# Patient Record
Sex: FEMALE | Race: OTHER | URBAN - METROPOLITAN AREA
[De-identification: names, ages, dates, MRNs, and addresses within clinical notes are randomized per-mention and may not be internally consistent; named-entity substitution may affect disease eponyms.]

---

## 2019-10-16 ENCOUNTER — APPOINTMENT (RX ONLY)
Dept: URBAN - METROPOLITAN AREA CLINIC 41 | Facility: CLINIC | Age: 59
Setting detail: DERMATOLOGY
End: 2019-10-16

## 2019-10-16 DIAGNOSIS — L259 CONTACT DERMATITIS AND OTHER ECZEMA, UNSPECIFIED CAUSE: ICD-10-CM

## 2019-10-16 DIAGNOSIS — L71.8 OTHER ROSACEA: ICD-10-CM

## 2019-10-16 PROBLEM — L23.9 ALLERGIC CONTACT DERMATITIS, UNSPECIFIED CAUSE: Status: ACTIVE | Noted: 2019-10-16

## 2019-10-16 PROCEDURE — ? PRESCRIPTION

## 2019-10-16 PROCEDURE — ? COUNSELING

## 2019-10-16 PROCEDURE — ? PRODUCT LINE (PHARMACEUTICALS)

## 2019-10-16 PROCEDURE — 99202 OFFICE O/P NEW SF 15 MIN: CPT

## 2019-10-16 PROCEDURE — ? OTHER

## 2019-10-16 RX ORDER — PREDNISONE 20 MG/1
1 TABLET ORAL QD
Qty: 42 | Refills: 0 | Status: ERX

## 2019-10-16 ASSESSMENT — LOCATION ZONE DERM
LOCATION ZONE: FACE
LOCATION ZONE: TRUNK

## 2019-10-16 ASSESSMENT — LOCATION SIMPLE DESCRIPTION DERM
LOCATION SIMPLE: RIGHT UPPER BACK
LOCATION SIMPLE: ABDOMEN
LOCATION SIMPLE: RIGHT CHEEK
LOCATION SIMPLE: LEFT CHEEK

## 2019-10-16 ASSESSMENT — LOCATION DETAILED DESCRIPTION DERM
LOCATION DETAILED: RIGHT INFERIOR CENTRAL MALAR CHEEK
LOCATION DETAILED: LEFT CENTRAL BUCCAL CHEEK
LOCATION DETAILED: LEFT CENTRAL MALAR CHEEK
LOCATION DETAILED: PERIUMBILICAL SKIN
LOCATION DETAILED: RIGHT INFERIOR MEDIAL UPPER BACK

## 2019-10-16 NOTE — PROCEDURE: OTHER
Note Text (......Xxx Chief Complaint.): This diagnosis correlates with the
Detail Level: Zone
Other (Free Text): Recommended patch testing to narrow down what is causing the eruption.

## 2019-10-16 NOTE — PROCEDURE: PRODUCT LINE (PHARMACEUTICALS)
Product 36 Price (In Dollars - Numeric Only, No Special Characters Or $): 0.00
Product 7 Application Directions: Apply to affected areas 1-2 times daily as needed for flares
Product 5 Units: 0
Product 15 Price (In Dollars - Numeric Only, No Special Characters Or $): 50.00
Name Of Product 3: Clobetasol Shampoo
Product 5 Application Directions: Apply to darken pigmented areas once daily for 3 months
Product 1 Price (In Dollars - Numeric Only, No Special Characters Or $): 30.00
Product 17 Price (In Dollars - Numeric Only, No Special Characters Or $): 160.00
Allow Plan To Count Towards E/M Coding: Yes
Name Of Product 11: Clobetasol cream
Product 12 Application Directions: Apply to affected areas once daily Monday - Friday for 6 weeks
Product 3 Price (In Dollars - Numeric Only, No Special Characters Or $): 20.00
Product 4 Application Directions: Apply to affected areas QD-BID as needed for flares
Name Of Product 14: Dermatitis Foam
Name Of Product 8: Tretinoin 0.025% Cream
Product 1 Application Directions: Apply to affected area QD-BID
Name Of Product 6: Rosacea Triple Gel
Product 14 Price (In Dollars - Numeric Only, No Special Characters Or $): 45.00
Name Of Product 2: Clobetasol Foam
Product 9 Application Directions: Apply to face nightly
Name Of Product 13: Tacrolimus ointment
Name Of Product 16: Tazarotene 0.05% hydrating cream
Name Of Product 5: Melasma Emulsion
Product 6 Units: 1
Name Of Product 10: Tretinoin 0.1% Cream
Product 6 Application Directions: Apply to face once daily
Product 3 Application Directions: Wash affected area of scalp once daily
Product 17 Application Directions: Take one pill once daily
Product 11 Application Directions: Apply once or twice daily to affected areas PRN flares
Name Of Product 7: Seborrhiec Dermatitis Cream
Detail Level: Detailed
Product 2 Application Directions: Apply to affected area QD-BID as needed for flares
Name Of Product 12: Actinic Keratosis Gel
Name Of Product 4: Clobetasol Solution
Product 16 Application Directions: Apply once daily to the face after moisturizing
Product 13 Application Directions: Apply once daily to affected areas
Name Of Product 15: Fluocinonide cream
Product 7 Price (In Dollars - Numeric Only, No Special Characters Or $): 40.00
Name Of Product 9: Tretinoin 0.05% Cream
Name Of Product 17: Viviscal
Name Of Product 1: Acne Triple Gel

## 2019-10-16 NOTE — HPI: RASH
How Severe Is Your Rash?: moderate
Is This A New Presentation, Or A Follow-Up?: Rash
Additional History: First of June she received a shingles vaccine . [place] was in the woods as well then the rash occurred. She has been on prednisone tapers twice. \\nAllegra and Benadryl has been used too. \\nShe used clindamycin solution too.

## 2019-10-24 ENCOUNTER — RX ONLY (OUTPATIENT)
Age: 59
Setting detail: RX ONLY
End: 2019-10-24

## 2019-10-24 RX ORDER — PREDNISONE 10 MG/1
1 TABLET ORAL QD
Qty: 49 | Refills: 0 | Status: ERX

## 2019-11-12 ENCOUNTER — RX ONLY (OUTPATIENT)
Age: 59
Setting detail: RX ONLY
End: 2019-11-12

## 2019-11-12 RX ORDER — PREDNISONE 10 MG/1
1 TABLET ORAL QD
Qty: 49 | Refills: 0 | Status: ERX

## 2019-11-27 ENCOUNTER — RX ONLY (OUTPATIENT)
Age: 59
Setting detail: RX ONLY
End: 2019-11-27

## 2019-12-11 ENCOUNTER — RX ONLY (OUTPATIENT)
Age: 59
Setting detail: RX ONLY
End: 2019-12-11

## 2019-12-11 RX ORDER — FLUTICASONE PROPIONATE 0.5 MG/G
1 CREAM TOPICAL BID
Qty: 1 | Refills: 6 | Status: ERX

## 2020-01-07 ENCOUNTER — APPOINTMENT (RX ONLY)
Dept: URBAN - METROPOLITAN AREA CLINIC 17 | Facility: CLINIC | Age: 60
Setting detail: DERMATOLOGY
End: 2020-01-07

## 2020-01-07 DIAGNOSIS — L56.5 DISSEMINATED SUPERFICIAL ACTINIC POROKERATOSIS (DSAP): ICD-10-CM

## 2020-01-07 DIAGNOSIS — L20.89 OTHER ATOPIC DERMATITIS: ICD-10-CM

## 2020-01-07 DIAGNOSIS — L71.8 OTHER ROSACEA: ICD-10-CM

## 2020-01-07 PROBLEM — L20.84 INTRINSIC (ALLERGIC) ECZEMA: Status: ACTIVE | Noted: 2020-01-07

## 2020-01-07 PROCEDURE — 99214 OFFICE O/P EST MOD 30 MIN: CPT

## 2020-01-07 PROCEDURE — ? TREATMENT REGIMEN

## 2020-01-07 PROCEDURE — ? RECOMMENDATIONS

## 2020-01-07 PROCEDURE — ? PRESCRIPTION

## 2020-01-07 PROCEDURE — ? COUNSELING

## 2020-01-07 RX ORDER — CLOBETASOL PROPIONATE 0.5 MG/ML
1 SOLUTION TOPICAL QD
Qty: 1 | Refills: 11 | Status: ERX

## 2020-01-07 RX ORDER — DESOXIMETASONE 2.5 MG/G
1 CREAM TOPICAL BID
Qty: 1 | Refills: 11 | Status: ERX

## 2020-01-07 ASSESSMENT — LOCATION DETAILED DESCRIPTION DERM
LOCATION DETAILED: RIGHT INFERIOR MEDIAL MALAR CHEEK
LOCATION DETAILED: STERNAL NOTCH
LOCATION DETAILED: RIGHT CENTRAL LATERAL NECK
LOCATION DETAILED: RIGHT DISTAL PRETIBIAL REGION
LOCATION DETAILED: LEFT CENTRAL MALAR CHEEK
LOCATION DETAILED: RIGHT INFERIOR MEDIAL MIDBACK

## 2020-01-07 ASSESSMENT — LOCATION SIMPLE DESCRIPTION DERM
LOCATION SIMPLE: RIGHT LOWER BACK
LOCATION SIMPLE: CHEST
LOCATION SIMPLE: RIGHT CHEEK
LOCATION SIMPLE: RIGHT PRETIBIAL REGION
LOCATION SIMPLE: LEFT CHEEK
LOCATION SIMPLE: NECK

## 2020-01-07 ASSESSMENT — LOCATION ZONE DERM
LOCATION ZONE: TRUNK
LOCATION ZONE: FACE
LOCATION ZONE: NECK
LOCATION ZONE: LEG

## 2020-01-07 NOTE — PROCEDURE: TREATMENT REGIMEN
Detail Level: Generalized
Continue Regimen: Doxycycline \\nRosacea triple gel
Discontinue Regimen: Antihistamines
Continue Regimen: Fluticasone cream - use on the face and neck twice daily until clear .apply  after moisturizing
Initiate Treatment: Clobetasol solution- apply to the scalp once daily as needed for flares \\nDesoximetasone cream- apply to the affected areas on the body twice daily as needed for flares. Apply after moisturizing

## 2020-01-07 NOTE — PROCEDURE: RECOMMENDATIONS
Detail Level: Zone
Recommendation Preamble: The following recommendations were made during the visit:
Recommendations (Free Text): CeraVe moisturizing cream\\nDove sensitive skin soap

## 2024-03-21 ENCOUNTER — APPOINTMENT (RX ONLY)
Dept: URBAN - METROPOLITAN AREA CLINIC 17 | Facility: CLINIC | Age: 64
Setting detail: DERMATOLOGY
End: 2024-03-21

## 2024-03-21 DIAGNOSIS — L71.8 OTHER ROSACEA: ICD-10-CM | Status: WELL CONTROLLED

## 2024-03-21 PROBLEM — C44.519 BASAL CELL CARCINOMA OF SKIN OF OTHER PART OF TRUNK: Status: ACTIVE | Noted: 2024-03-21

## 2024-03-21 PROCEDURE — ? TREATMENT REGIMEN

## 2024-03-21 PROCEDURE — ? PRESCRIPTION

## 2024-03-21 PROCEDURE — ? OTHER

## 2024-03-21 PROCEDURE — 99203 OFFICE O/P NEW LOW 30 MIN: CPT

## 2024-03-21 PROCEDURE — ? COUNSELING

## 2024-03-21 RX ORDER — IVERMECTIN/METRONIDAZOLE/NIACI 1 %-1 %-4%
1 GEL (GRAM) TOPICAL QD
Qty: 30 | Refills: 11 | Status: ERX | COMMUNITY
Start: 2024-03-21

## 2024-03-21 RX ADMIN — Medication 1: at 00:00

## 2024-03-21 ASSESSMENT — LOCATION DETAILED DESCRIPTION DERM
LOCATION DETAILED: LEFT INFERIOR CENTRAL MALAR CHEEK
LOCATION DETAILED: RIGHT INFERIOR CENTRAL MALAR CHEEK

## 2024-03-21 ASSESSMENT — LOCATION ZONE DERM: LOCATION ZONE: FACE

## 2024-03-21 ASSESSMENT — LOCATION SIMPLE DESCRIPTION DERM
LOCATION SIMPLE: RIGHT CHEEK
LOCATION SIMPLE: LEFT CHEEK

## 2024-03-21 NOTE — PROCEDURE: TREATMENT REGIMEN
Plan: Discussed that the patient will watch the area and to give it 6 months and if pink patch comes back then we can send in imiquimod.
Detail Level: Simple
Initiate Treatment: Aveidaoxia 1 %-1 %-4 % topical gel QD

## 2024-03-21 NOTE — PROCEDURE: COUNSELING
Detail Level: Simple
Sunscreen Recommendations: Discussed importance of photo protection with mineral based sunscreen and photo protective clothing

## 2024-03-21 NOTE — HPI: SKIN LESION (BASAL CELL CARCINOMA)
How Severe Is Your Skin Cancer?: mild
Is This A New Presentation, Or A Follow-Up?: Basal Cell Carcinoma
When Was Basal Cell Biopsied? (Optional): 2/23/2024

## 2024-03-21 NOTE — PROCEDURE: OTHER
Detail Level: Detailed
Note Text (......Xxx Chief Complaint.): This diagnosis correlates with the
Render Risk Assessment In Note?: no
Other (Free Text): Pathology does not match the description of the site biopsied or the DDX